# Patient Record
Sex: FEMALE | Race: WHITE
[De-identification: names, ages, dates, MRNs, and addresses within clinical notes are randomized per-mention and may not be internally consistent; named-entity substitution may affect disease eponyms.]

---

## 2019-08-17 ENCOUNTER — HOSPITAL ENCOUNTER (EMERGENCY)
Dept: HOSPITAL 56 - MW.ED | Age: 44
Discharge: HOME | End: 2019-08-17
Payer: COMMERCIAL

## 2019-08-17 DIAGNOSIS — J98.01: Primary | ICD-10-CM

## 2019-08-17 DIAGNOSIS — Z77.098: ICD-10-CM
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
## 2021-08-17 ENCOUNTER — HOSPITAL ENCOUNTER (EMERGENCY)
Dept: HOSPITAL 56 - MW.ED | Age: 46
Discharge: HOME | End: 2021-08-17
Payer: SELF-PAY

## 2021-08-17 DIAGNOSIS — R10.12: ICD-10-CM

## 2021-08-17 DIAGNOSIS — R10.13: Primary | ICD-10-CM

## 2021-08-17 DIAGNOSIS — R10.11: ICD-10-CM

## 2021-08-17 LAB
BUN SERPL-MCNC: 22 MG/DL (ref 7–18)
CHLORIDE SERPL-SCNC: 104 MMOL/L (ref 98–107)
CO2 SERPL-SCNC: 23.6 MMOL/L (ref 21–32)
GLUCOSE SERPL-MCNC: 86 MG/DL (ref 74–106)
LIPASE SERPL-CCNC: 104 U/L (ref 73–393)
POTASSIUM SERPL-SCNC: 3.9 MMOL/L (ref 3.5–5.1)
SODIUM SERPL-SCNC: 138 MMOL/L (ref 136–145)

## 2021-08-17 PROCEDURE — 99284 EMERGENCY DEPT VISIT MOD MDM: CPT

## 2021-08-17 PROCEDURE — 83690 ASSAY OF LIPASE: CPT

## 2021-08-17 PROCEDURE — 74177 CT ABD & PELVIS W/CONTRAST: CPT

## 2021-08-17 PROCEDURE — 96374 THER/PROPH/DIAG INJ IV PUSH: CPT

## 2021-08-17 PROCEDURE — 96375 TX/PRO/DX INJ NEW DRUG ADDON: CPT

## 2021-08-17 PROCEDURE — 85025 COMPLETE CBC W/AUTO DIFF WBC: CPT

## 2021-08-17 PROCEDURE — 81003 URINALYSIS AUTO W/O SCOPE: CPT

## 2021-08-17 PROCEDURE — 80053 COMPREHEN METABOLIC PANEL: CPT

## 2021-08-17 PROCEDURE — 36415 COLL VENOUS BLD VENIPUNCTURE: CPT

## 2021-08-17 NOTE — CT
INDICATION:



Epigastric pain.



TECHNIQUE:



CT abdomen and pelvis with intravenous contrast, 100 mL of Isovue-370. 

Coronal and sagittal reformats.



COMPARISON:



None available.



FINDINGS:



The imaged lower chest appears unremarkable.



Normal liver contour. No suspicious hepatic lesion. Portal vein is patent. 

No biliary dilatation. Gallbladder surgically absent. The pancreas, spleen, 

and adrenals appear unremarkable.



Symmetric renal enhancement. No hydronephrosis bilaterally. Unremarkable 

urinary bladder. Right adnexal 2.2 cm cystic-appearing lesion (series 201, 

image 137). 



The bowel appears normal in caliber and enhancement diffusely. Normal 

appendix. No free air, free fluid, focal collection, or lymphadenopathy.



Normal caliber abdominal aorta with mild atherosclerotic calcification. 

Major branch vessels appear patent. No suspicious osseous lesion. Lower 

lumbar spondylosis. 



IMPRESSION:



1. No acute findings or CT correlate for abdominal pain definitively 

identified.



2. Gallbladder surgically absent. 



3. Right adnexal 2.2 cm cystic lesion, likely physiologic in the 

premenopausal setting.



Dictated by Asif King MD @ 8/17/2021 8:02:03 PM



Please note that all CT scans at this facility use dose modulation, 

iterative reconstruction, and/or weight-based dosing when appropriate to 

reduce radiation dose to as low as reasonably achievable.



Dictated by: Asif King MD @ 08/17/2021 20:02:12



(Electronically Signed)

## 2021-08-17 NOTE — EDM.PDOC
<Jsutin Cross - Last Filed: 08/17/21 19:51>





ED HPI GENERAL MEDICAL PROBLEM





- General


Chief Complaint: General


Stated Complaint: RECENT BLOOD WORK SHOWS PANCREAS TROUBLES


Time Seen by Provider: 08/17/21 18:00





- History of Present Illness


INITIAL COMMENTS - FREE TEXT/NARRATIVE: 





CHIEF COMPLAINT(S): Abdominal pain








HISTORY OF PRESENT ILLNESS: This is a 46-year-old woman with a past medical 

history of obesity who comes to the emergency department with a chief complaint 

of abdominal pain.  The patient states that for approximately 1 month now she 

has been experiencing epigastric abdominal pain.  She states that she followed 

up with her primary care physician and they did some laboratory work.  They 

stated that her pancreas was inflamed and have been following with her 

outpatient.  They states that last week she saw some increased protein in her 

urine.  She states that during this whole month she has been experiencing 

epigastric abdominal pain worsened by eating.  She describes it as sharp without

any radiation.  She denies any associated nausea or vomiting but she states that

any type of eating causes her to buckle over in pain.  She states that she is 

started to have foul-smelling stools.  She denies any relieving factors.  She 

states that she is mainly concerned because she tried to eat this morning and 

had severe pain and came to the emergency department given the history of panc

reatic inflammation





 


REVIEW OF SYSTEMS: 





Constitutional: Denies fever, chills.


Eyes: Denies eye pain


Ears, Nose, Mouth, & Throat: Denies earache


Cardiovascular: Denies chest pain


Respiratory: Denies shortness of breath


Gastrointestinal: Positive for abdominal pain in the epigastric region and foul-

smelling stools.  Denies nausea, vomiting, diarrhea, medic easier, melena, 

hematemesis, bilious emesis genitourinary: Denies hematuria


Skin:Denies a rash


MSK: Denies joint pain


Neurological: Denies blurred vision


Psychiatric: Denies depression








PAST MEDICAL HISTORY: As per history of present illness and as reviewed below 

otherwise noncontributory.





SURGICAL HISTORY: As per history of present illness and as reviewed below 

otherwise noncontributory.





SOCIAL HISTORY: As per history of present illness and as reviewed below 

otherwise noncontributory.





FAMILY HISTORY: As per history of present illness and as reviewed below 

otherwise noncontributory.








EXAMINATION OF ORGAN SYSTEMS/BODY AREAS: 





Constitutional: Blood pressure was 137/70, heart rate 74, respiratory rate 18 

with an oxygen saturation of 97 7% on room air.  Temperature 36.6


General: Obese woman who is in no acute distress


Psychiatric: Appropriate mood and affect.


Eyes: No scleral icterus or conjunctival erythema


ENMT: Moist mucous membranes. No pharyngeal erythema


Cardiovascular: Regular, rate, and rhythm.  No gallops, murmurs, or rubs.  

Bilateral upper extremity pulses symmetric and intact.  No peripheral edema.  No

JVD.


Respiratory: Lungs clear to auscultation bilaterally.  No wheezes, rales, or 

rhonchi.


Gastrointestinal: Soft, nondistended, tenderness to palpation in the epigastric 

region.  No rebound or guarding.  Negative Bermudez's and McBurney's.  Normoactive

bowel sounds.


Genitourinary: No suprapubic tenderness


Musculoskeletal: Normal range of motion.


Skin: No lesions or abrasions.


Neurological:     Alert, GCS 15








MEDICAL DECISION MAKING AND COURSE IN THE ED WITH INTERPRETATION/REVIEW OF 

DIAGNOSTIC STUDIES: This is a 46-year-old woman with a past medical history of 

obesity who comes to the emergency department with acute epigastric abdominal 

pain associated with foul-smelling stools and outpatient laboratory work-up 

indicating pancreatic inflammation.  At this time differential includes 

pancreatitis.  Given that she is having severe pain with eating and she does not

have a gallbladder pancreatic complication such as pancreatic pseudocyst versus 

peptic ulcer disease are on the differential.  We will make the patient n.p.o. 

at this time.  We will provide the patient with 1 L of normal saline and 4 mg of

morphine for pain.  We will provide her with 4 mg of IV morphine.  Obtain CBC, 

CMP, lipase and obtain a CT abdomen pelvis with contrast.





Laboratory: CBC reveals a leukocytosis of 11.39 with normal indices.  CMP is 

unremarkable.  Lipase is 104.  Urinalysis is negative.





At the time of signout the patient CT had been completed however had not been 

read.  The patient was signed out pending this imaging and final disposition.





DISPOSITION: Patient was signed out to oncoming night team physician pending 

imaging and final disposition 





CONDITION: Fair





PROCEDURES: None





FINAL IMPRESSION(S)/DIAGNOSES: 





1.  Acute epigastric abdominal pain, suspect pancreatitis





 





Justin Cross M.D.


  ** bilateral upper abdomen


Pain Score (Numeric/FACES): 7





- Related Data


                                    Allergies











Allergy/AdvReac Type Severity Reaction Status Date / Time


 


No Known Allergies Allergy   Verified 08/17/21 15:16











Home Meds: 


                                    Home Meds





Omeprazole Magnesium [Prilosec Otc] 20 mg PO BID #30 tablet. 08/17/21 [Rx]











Past Medical History





- Past Health History


Medical/Surgical History: Denies Medical/Surgical History


HEENT History: Reports: None


Cardiovascular History: Reports: None


Respiratory History: Reports: None


Genitourinary History: Reports: None


OB/GYN History: Reports: Pregnancy


Musculoskeletal History: Reports: None


Neurological History: Reports: None


Psychiatric History: Reports: None


Endocrine/Metabolic History: Reports: None


Hematologic History: Reports: None


Immunologic History: Reports: None


Oncologic (Cancer) History: Reports: None


Dermatologic History: Reports: None





- Infectious Disease History


Infectious Disease History: Reports: None





- Past Surgical History


Head Surgeries/Procedures: Reports: None


GI Surgical History: Reports: Cholecystectomy





Social & Family History





- Family History


Family Medical History: No Pertinent Family History





- Tobacco Use


Tobacco Use Status *Q: Never Tobacco User


Second Hand Smoke Exposure: No





- Caffeine Use


Caffeine Use: Reports: None





- Alcohol Use


Days Per Week of Alcohol Use: 1


Number of Drinks Per Day: 1


Total Drinks Per Week: 1





- Recreational Drug Use


Recreational Drug Use: No





ED ROS GENERAL





- Review of Systems


Review Of Systems: See Below





ED EXAM, GENERAL





- Physical Exam


Exam: See Below





Departure





- Departure


Disposition: Home, Self-Care 01


Clinical Impression: 


 Abdominal pain








- Discharge Information


Instructions:  Abdominal Pain, Adult


Referrals: 


Cindy Alvarado, NP [Primary Care Provider] - 


Forms:  ED Department Discharge


Additional Instructions: 


You were seen and evaluated in the ER today secondary to persistent abdominal 

discomfort.  All the blood tests that we obtained were all within normal limits.

 Your urinalysis also was normal.  CT scan of your abdomen/pelvis did not reveal

any inflammation anywhere inside your abdomen that could cause the pain that you

are having.  At this time, the cause of your pain is unidentified however no 

acute abnormalities were identified that would need any emergent treatment at 

this time.  Please make an appointment to follow-up with your doctor to get a 

referral to see either a gastroenterologist or a general surgeon to further 

evaluate your symptoms.  They may opt to obtain either an endoscopy on you or 

other tests to help diagnose your symptoms.  In the meantime, I would recommend 

starting Prilosec 20 mg twice a day for 2 weeks and then changing it to 20 mg 

daily.





The following information is given to patients seen in the emergency department 

who are being discharged to home. This information is to outline your options 

for follow-up care. We provide all patients seen in our emergency department 

with a follow-up referral.





The need for follow-up, as well as the timing and circumstances, are variable 

depending upon the specifics of your emergency department visit.





If you don't have a primary care physician on staff, we will provide you with a 

referral. We always advise you to contact your personal physician following an 

emergency department visit to inform them of the circumstance of the visit and 

for follow-up with them and/or the need for any referrals to a consulting 

specialist.





The emergency department will also refer you to a specialist when appropriate. 

This referral assures that you have the opportunity for follow-up care with a 

specialist. All of these measure are taken in an effort to provide you with 

optimal care, which includes your follow-up.





Under all circumstances we always encourage you to contact your private 

physician who remains a resource for coordinating your care. When calling for 

follow-up care, please make the office aware that this follow-up is from your 

recent emergency room visit. If for any reason you are refused follow-up, please

contact the [facility] Emergency Department

at [phone] and asked to speak to the emergency department charge nurse.





[facility]


[address] [place] [id number]


Phone: [phone]


Fax: [phone]








[place] [address]


Phone: [phone]


Fax: [phone]








Sepsis Event Note (ED)





- Evaluation


Sepsis Screening Result: No Definite Risk





<Betito Pendleton - Last Filed: 08/17/21 20:50>





ED HPI GENERAL MEDICAL PROBLEM





- History of Present Illness


INITIAL COMMENTS - FREE TEXT/NARRATIVE: 


8:46 PM: Signout received at 7 PM.  Patient's labs including CBC, CMP, lipase, 

urinalysis were all within normal limits.  CT scan of the abdomen pelvis did not

show any significant pathology that would cause the patient's abdominal 

discomfort.  Patient was reevaluated by me and at this time patient abdominal 

exam is relatively benign.  Patient is some mild discomfort in the midepigastric

region.  Patient abdomen is otherwise soft, nondistended with no rebound or 

guarding.  Patient does not present with signs or symptoms that would be 

consistent or concerning for an acute surgical abdomen.  Patient reports that 

her pain increases after meals but has it at baseline at all times.  It is 

unclear whether or not this might be secondary to a gastric ulcer.  I will 

initiate therapy with Prilosec for the patient and have her follow-up with her 

primary care physician for referral to see a GI doctor or a general surgeon to 

get an endoscopy performed to further assist with diagnosing her pain.





Abd:  Soft, nondistended, no rebound/guarding, no psoas or obturator signs, no 

tenderness at Mcberney's point, no Bermudez's sign.  Pt does not present with an 

exam that would be consistent with an acute surgical abdomen at this time.   

mild tenderness to palpation in the midepigastric region.





Reassessment at the time of disposition demonstrates that the patient is in no 

acute distress.  The patient has remained stable throughout the entire ED visit 

and is without objective evidence for acute process requiring urgent 

intervention or hospitalization. The patient is stable for discharge, counseling

is provided as documented above, discussed symptomatic treatment and specific 

conditions for return.





I have spoken with the patient/caregiver and discussed todays findings, in 

addition to providing specific details for the plan of care. Questions are 

answered and there is agreement with the plan.





Course





- Vital Signs


Last Recorded V/S: 





                                Last Vital Signs











Temp  97.6 F   08/17/21 18:22


 


Pulse  63   08/17/21 18:22


 


Resp  20   08/17/21 18:22


 


BP  159/86 H  08/17/21 18:22


 


Pulse Ox  98   08/17/21 18:22














- Orders/Labs/Meds


Labs: 





                                Laboratory Tests











  08/17/21 08/17/21 08/17/21 Range/Units





  17:00 18:04 18:04 


 


WBC    11.39 H  (4.0-11.0)  K/uL


 


RBC    5.01  (4.30-5.90)  M/uL


 


Hgb    15.1  (12.0-16.0)  g/dL


 


Hct    43.9  (36.0-46.0)  %


 


MCV    87.6  (80.0-98.0)  fL


 


MCH    30.1  (27.0-32.0)  pg


 


MCHC    34.4  (31.0-37.0)  g/dL


 


RDW Std Deviation    43.9  (28.0-62.0)  fl


 


RDW Coeff of Anabel    14  (11.0-15.0)  %


 


Plt Count    229  (150-400)  K/uL


 


MPV    10.10  (7.40-12.00)  fL


 


Neut % (Auto)    70.9  (48.0-80.0)  %


 


Lymph % (Auto)    22.4  (16.0-40.0)  %


 


Mono % (Auto)    5.4  (0.0-15.0)  %


 


Eos % (Auto)    1.1  (0.0-7.0)  %


 


Baso % (Auto)    0.2  (0.0-1.5)  %


 


Neut # (Auto)    8.1 H  (1.4-5.7)  K/uL


 


Lymph # (Auto)    2.6 H  (0.6-2.4)  K/uL


 


Mono # (Auto)    0.6  (0.0-0.8)  K/uL


 


Eos # (Auto)    0.1  (0.0-0.7)  K/uL


 


Baso # (Auto)    0.0  (0.0-0.1)  K/uL


 


Nucleated RBC %    0.0  /100WBC


 


Nucleated RBCs #    0  K/uL


 


Sodium   138   (136-145)  mmol/L


 


Potassium   3.9   (3.5-5.1)  mmol/L


 


Chloride   104   ()  mmol/L


 


Carbon Dioxide   23.6   (21.0-32.0)  mmol/L


 


BUN   22 H   (7.0-18.0)  mg/dL


 


Creatinine   0.8   (0.6-1.0)  mg/dL


 


Est Cr Clr Drug Dosing   77.38   mL/min


 


Estimated GFR (MDRD)   > 60.0   ml/min


 


Glucose   86   ()  mg/dL


 


Calcium   8.1 L   (8.5-10.1)  mg/dL


 


Total Bilirubin   0.3   (0.2-1.0)  mg/dL


 


AST   24   (15-37)  IU/L


 


ALT   35   (14-63)  IU/L


 


Alkaline Phosphatase   83   ()  U/L


 


Total Protein   7.6   (6.4-8.2)  g/dL


 


Albumin   3.6   (3.4-5.0)  g/dL


 


Globulin   4.0   (2.6-4.0)  g/dL


 


Albumin/Globulin Ratio   0.9   (0.9-1.6)  


 


Lipase   104   ()  U/L


 


Urine Color  YELLOW    


 


Urine Appearance  CLEAR    


 


Urine pH  6.0    (5.0-8.0)  


 


Ur Specific Gravity  1.025    (1.001-1.035)  


 


Urine Protein  NEGATIVE    (NEGATIVE)  mg/dL


 


Urine Glucose (UA)  NEGATIVE    (NEGATIVE)  mg/dL


 


Urine Ketones  NEGATIVE    (NEGATIVE)  mg/dL


 


Urine Occult Blood  NEGATIVE    (NEGATIVE)  


 


Urine Nitrite  NEGATIVE    (NEGATIVE)  


 


Urine Bilirubin  NEGATIVE    (NEGATIVE)  


 


Urine Urobilinogen  0.2    (<2.0)  EU/dL


 


Ur Leukocyte Esterase  NEGATIVE    (NEGATIVE)  











Meds: 





Medications














Discontinued Medications














Generic Name Dose Route Start Last Admin





  Trade Name Freq  PRN Reason Stop Dose Admin


 


Sodium Chloride  1,000 mls @ 1,000 mls/hr  08/17/21 18:06  08/17/21 18:23





  Normal Saline  IV  08/17/21 19:05  1,000 mls/hr





  .Bolus ONE   Administration


 


Iopamidol  100 ml  08/17/21 19:13  08/17/21 19:14





  Iopamidol 755 Mg/Ml 500 Ml Multipack Bottle  IVPUSH  08/17/21 19:14  100 ml





  ONETIME STA   Administration


 


Morphine Sulfate  4 mg  08/17/21 18:07  08/17/21 18:24





  Morphine 4 Mg/Ml Syringe  IVPUSH  08/17/21 18:08  4 mg





  ONETIME ONE   Administration


 


Ondansetron HCl  4 mg  08/17/21 18:26  08/17/21 18:27





  Ondansetron 4 Mg/2 Ml Sdv  IVPUSH  08/17/21 18:27  4 mg





  ONETIME ONE   Administration


 


Ondansetron HCl  Confirm  08/17/21 18:26  08/17/21 18:28





  Ondansetron 4 Mg/2 Ml Sdv  Administered  08/17/21 18:27  Not Given





  Dose  





  4 mg  





  .ROUTE  





  .STK-MED ONE  














Departure





- Departure


Time of Disposition: 20:48


Condition: Good





Sepsis Event Note (ED)





- Focused Exam


Vital Signs: 





                                   Vital Signs











  Temp Pulse Resp BP Pulse Ox


 


 08/17/21 18:22  97.6 F  63  20  159/86 H  98


 


 08/17/21 17:57  98.3 F  66  20  159/108 H  97


 


 08/17/21 15:12  97.8 F  74  18  137/70  97

## 2021-09-28 ENCOUNTER — HOSPITAL ENCOUNTER (OUTPATIENT)
Dept: HOSPITAL 56 - MW.SDS | Age: 46
Discharge: HOME | End: 2021-09-28
Attending: SURGERY
Payer: COMMERCIAL

## 2021-09-28 DIAGNOSIS — Z87.891: ICD-10-CM

## 2021-09-28 DIAGNOSIS — Z90.49: ICD-10-CM

## 2021-09-28 DIAGNOSIS — R11.0: ICD-10-CM

## 2021-09-28 DIAGNOSIS — Z91.018: ICD-10-CM

## 2021-09-28 DIAGNOSIS — Z98.890: ICD-10-CM

## 2021-09-28 DIAGNOSIS — Z20.822: ICD-10-CM

## 2021-09-28 DIAGNOSIS — Z01.812: ICD-10-CM

## 2021-09-28 DIAGNOSIS — Z88.8: ICD-10-CM

## 2021-09-28 DIAGNOSIS — Z79.899: ICD-10-CM

## 2021-09-28 DIAGNOSIS — K57.30: Primary | ICD-10-CM

## 2021-09-28 PROCEDURE — 87635 SARS-COV-2 COVID-19 AMP PRB: CPT

## 2021-09-28 PROCEDURE — 43239 EGD BIOPSY SINGLE/MULTIPLE: CPT

## 2021-09-28 PROCEDURE — 45380 COLONOSCOPY AND BIOPSY: CPT

## 2021-09-28 PROCEDURE — U0002 COVID-19 LAB TEST NON-CDC: HCPCS

## 2021-09-28 PROCEDURE — 88305 TISSUE EXAM BY PATHOLOGIST: CPT

## 2021-09-28 NOTE — OR
SURGEON:

SELENA SLAUGHTER MD

 

DATE OF PROCEDURE:  09/28/2021

 

PREOPERATIVE DIAGNOSES:

Nausea, change in bowel habits.

 

POSTOPERATIVE DIAGNOSES:

Nausea, change in bowel habits, diverticulosis.

 

PROCEDURE PERFORMED:

Diagnostic esophagogastroduodenoscopy and colonoscopy.

 

PRIMARY SURGEON:

Selena Slaughter MD

 

ANESTHESIA:

MAC.

 

INSTRUMENTS USED:

Olympus endoscope and colonoscope.

 

EXTENT OF EXAM:

To the second portion duodenum, to the cecum.

 

PREPARATION:

Good.

 

LIMITATIONS:

None.

 

INDICATIONS FOR EXAMINATION:

The patient is a 46-year-old female who presents with a change in her bowel

habits as well as various abdominal complaints including nausea.  The decision

was made to proceed to the operating room and perform a diagnostic EGD and

colonoscopy with biopsies.  I explained the procedure, expected perioperative

course, and risks.  The patient verbalized understanding and wishes to proceed.

 

PROCEDURE IN DETAIL:

The patient was brought in to the endoscopy suite and placed in the left lateral

decubitus position.  A time-out was completed verifying the patient's name, age,

date of birth, allergies, and procedure to be performed.  A bite block was

placed in the patient's mouth.  Continuous oxygen was provided via face mask

throughout the procedure.  After adequate sedation was achieved, a well-

lubricated endoscope was placed in the patient's mouth and advanced under direct

visualization to the second portion of duodenum.  This appeared normal and a

photograph was taken.  The scope was then fully withdrawn while examining the

color, texture, anatomy, and integrity of mucosa of the upper GI tract.  The

duodenum showed no signs of inflammation or ulceration.  A biopsy was taken in

the duodenal bulb and sent to Pathology for histologic review.  The scope was

brought into the stomach.  A photograph was taken of the pylorus as well as the

GE junction.  Both appeared anatomically normal.  Biopsies were taken of the

antrum, body, and fundus, and sent for histologic review and H pylori testing.

The gastric mucosa was free of inflammation or ulceration.  The scope was then

brought into the distal esophagus.  The Z-line appeared to be normal and a

photograph was taken.  A biopsy was taken 1 cm above the Z-line and sent to

Pathology for histologic review.  The remainder of the esophagus appeared

normal.  The scope was removed and this portion of procedure terminated.  A

digital rectal exam was performed.  This exam was within normal limits.  A well-

lubricated colonoscope was inserted into the rectum and advanced under direct

visualization to the level of the cecum.  The cecum was identified by both

visual and anatomic landmarks.  A photograph was taken of the cecal cap;

however, I was unable to retroflex the scope within the cecum due to looping of

the scope more proximally.  The scope was then fully withdrawn while examining

the color, texture, anatomy, and integrity of the mucosa from the cecum to the

anal canal.  I was able to closely inspect the terminal ileum.  This appeared

normal with no signs of inflammation.  The colonic mucosa was normal other than

a few scattered diverticula in the second half of the colon.  Random biopsies

were taken of the cecum, ascending colon, transverse colon, descending colon,

sigmoid colon, and rectum and sent to Pathology for histologic review.  The

scope was retroflexed within the rectum to visualize the anal canal opening.

This appeared normal and a photograph was taken.  The scope was then

straightened out and fully withdrawn.  The cecum to anus time was 11 minutes.

The patient tolerated the procedure well and was transferred to the PACU in

stable condition.

 

ENDOSCOPIC DIAGNOSES:

1. Nausea.

2. Change in bowel habits.

3. Diverticulosis.

 

RECOMMENDATIONS:

We will follow up with the patient in clinic in two weeks to discuss her biopsy

results.  In the meantime, I will try dicyclomine as well as cholestyramine to

see if I can control the patient's diarrhea and abdominal crampy pain.

 

 

MICHELLE MOSS

DD:  09/28/2021 11:41:03

DT:  09/28/2021 15:54:29

Job #:  [id number]

## 2021-09-28 NOTE — PCM.PREANE
Preanesthetic Assessment





- Procedure


Proposed Procedure: 





EGD, Colonoscopy





- Anesthesia/Transfusion/Family Hx


Anesthesia History: Prior Anesthesia Without Reaction


Other Type of Anesthesia Reaction Comment: "nausea after 2nd c/section, no 

problems since"


Family History of Anesthesia Reaction: No


Transfusion History: No Prior Transfusion(s)





- Review of Systems


General: No Symptoms


Pulmonary: No Symptoms (Quit smoking x 12 yrs)


Cardiovascular: No Symptoms


Gastrointestinal: No Symptoms


Neurological: No Symptoms


Other: Reports: None





- Physical Assessment


NPO Status Date: 21


NPO Status Time: 19:00 (Solids, >8hrs Liq)


Vital Signs: 





                                Last Vital Signs











Temp  97.9 F   21 09:35


 


Pulse  60   21 09:35


 


Resp  15   21 09:35


 


BP  142/72 H  21 09:35


 


Pulse Ox  96   21 09:35











Height: 5 ft 5 in


Weight: 146.964 kg (Morbid Obesity)


ASA Class: 3


Mental Status: Alert & Oriented x3


Airway Class: Mallampati = 2


Dentition: Reports: Normal Dentition


Thyro-Mental Finger Breadths: 3


Mouth Opening Finger Breadths: 3


ROM/Head Extension: Full


Lungs: Clear to Auscultation, Normal Respiratory Effort


Cardiovascular: Regular Rate, Regular Rhythm





- Lab


Values: 





                             Laboratory Last Values











SARS-CoV-2 RNA ([facility])  NEGATIVE  (NEGATIVE)   21  08:32    














- Allergies


Allergies/Adverse Reactions: 


                                    Allergies











Allergy/AdvReac Type Severity Reaction Status Date / Time


 


chocolate flavor Allergy  Hives Verified 09/15/21 08:26


 


oxycodone [From OxyContin] Allergy  Hallucinati Verified 09/15/21 08:26





   ons  














- Acknowledgements


Anesthesia Type Planned: General Anesthesia


Pt an Appropriate Candidate for the Planned Anesthesia: Yes


Alternatives and Risks of Anesthesia Discussed w Pt/Guardian: Yes


Pt/Guardian Understands and Agrees with Anesthesia Plan: Yes





PreAnesthesia Questionnaire





- Past Health History


Medical/Surgical History: Denies Medical/Surgical History


HEENT History: Reports: Macular Degeneration


Other HEENT History: wears glasses


Cardiovascular History: Reports: None


Respiratory History: Reports: None


Gastrointestinal History: Reports: Colon Polyp, GERD, Other (See Below)


Other Gastrointestinal History: Hx colitis


Genitourinary History: Reports: None


OB/GYN History: Reports: Pregnancy


Musculoskeletal History: Reports: Other (See Below)


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


Neurological History: Reports: None


Psychiatric History: Reports: None


Endocrine/Metabolic History: Reports: Obesity/BMI 30+


Hematologic History: Reports: None


Immunologic History: Reports: None


Oncologic (Cancer) History: Reports: None


Dermatologic History: Reports: None





- Infectious Disease History


Infectious Disease History: Reports: None





- Past Surgical History


Head Surgeries/Procedures: Reports: None


HEENT Surgical History: Reports: None


Cardiovascular Surgical History: Reports: None


Respiratory Surgical History: Reports: None


GI Surgical History: Reports: Cholecystectomy, Colonoscopy, Hernia, Abdominal


Female  Surgical History: Reports:  Section, Hysterectomy


Endocrine Surgical History: Reports: None


Neurological Surgical History: Reports: None


Musculoskeletal Surgical History: Reports: Other (See Below)


Other Musculoskeletal Surgeries/Procedures:: left elbow fusion


Oncologic Surgical History: Reports: None


Dermatological Surgical History: Reports: None





- SUBSTANCE USE


Tobacco Use Status *Q: Former Tobacco User


Tobacco Use Within Last Twelve Months: No





- HOME MEDS


Home Medications: 


                                    Home Meds





Carboxymethylcellulose Sodium [Artificial Tears] 1 drop EYEBOTH ASDIRECTED PRN 

09/15/21 [History]


Ergocalciferol (Vitamin D2) [Vitamin D2] 10 mcg PO WEEKLY 09/15/21 [History]


Fish Oil/Omega-3 Fatty Acids [Fish Oil 1,000 MG] 1,000 mg PO DAILY 09/15/21 

[History]


Pantoprazole Sodium [Protonix] 40 mg PO DAILY 09/15/21 [History]











- CURRENT (IN HOUSE) MEDS


Current Meds: 





                               Current Medications





Lactated Ringer's (Ringers, Lactated)  1,000 mls @ 125 mls/hr IV ASDIRECTED [facility]


   Last Admin: 21 09:52 Dose:  125 mls/hr


   Documented by: 


Sodium Chloride (Sodium Chloride 0.9% 10 Ml Syringe)  10 ml FLUSH ASDIRECTED PRN


   PRN Reason: Keep Vein Open


Sodium Chloride (Sodium Chloride 0.9% 2.5 Ml Syringe)  2.5 ml FLUSH ASDIRECTED 

PRN


   PRN Reason: Keep Vein Open


Sodium Chloride (Sodium Chloride 0.9% 10 Ml Syringe)  10 ml FLUSH ASDIRECTED PRN


   PRN Reason: Keep Vein Open


Sodium Chloride (Sodium Chloride 0.9% 2.5 Ml Syringe)  2.5 ml FLUSH ASDIRECTED 

PRN


   PRN Reason: Keep Vein Open


Sodium Chloride (Sodium Chloride 0.9% 10 Ml Sdv)  10 ml IV ASDIRECTED PRN


   PRN Reason: IV Use

## 2021-09-28 NOTE — [id number]
Post Anesthesia Note





- EVALUATION WITHIN 48HRS OF ANESTHETIC


Vital Signs in Normal Range: Yes


Patient Participated in Evaluation: Yes


Respiratory Function Stable: Yes


Airway Patent: Yes


Cardiovascular Function Stable: Yes


Hydration Status Stable: Yes


Pain Control Satisfactory: Yes


Nausea and Vomiting Control Satisfactory: Yes


Mental Status Recovered: Yes


Vital Signs: 


                                Last Vital Signs











Temp  98.4 F   09/28/21 11:39


 


Pulse  64   09/28/21 11:39


 


Resp  15   09/28/21 11:39


 


BP  93/48 L  09/28/21 11:39


 


Pulse Ox  96   09/28/21 11:39

## 2021-09-28 NOTE — PCM.OPNOTE
- General Post-Op/Procedure Note


Date of Surgery/Procedure: 09/28/21


Operative Procedure(s): Diagnostic EGD and colonoscopy


Findings: 





Normal appearing EGD and colonoscopy 


Pre Op Diagnosis: Abdominal pain, nausea, change in bowel habits


Post-Op Diagnosis: Abdominal pain, nausea, change in bowel habits, 

diverticulosis


Anesthesia Technique: MAC


Primary Surgeon: Selena Slaughter


Condition: Good

## 2021-09-28 NOTE — PCM.POSTAN
POST ANESTHESIA ASSESSMENT





- MENTAL STATUS


Mental Status: Alert, Oriented





- VITAL SIGNS


Vital Signs: 


                                Last Vital Signs











Temp  97.9 F   09/28/21 09:35


 


Pulse  60   09/28/21 09:35


 


Resp  15   09/28/21 09:35


 


BP  142/72 H  09/28/21 09:35


 


Pulse Ox  96   09/28/21 09:35














- RESPIRATORY


Respiratory Status: Respiratory Rate WNL, Airway Patent, O2 Saturation Stable, 

Supplemental Oxygen





- CARDIOVASCULAR


CV Status: Pulse Rate WNL, Blood Pressure Stable





- GASTROINTESTINAL


GI Status: No Symptoms





- POST OP HYDRATION


Hydration Status: Adequate & Stable